# Patient Record
Sex: FEMALE | Employment: OTHER | URBAN - METROPOLITAN AREA
[De-identification: names, ages, dates, MRNs, and addresses within clinical notes are randomized per-mention and may not be internally consistent; named-entity substitution may affect disease eponyms.]

---

## 2024-07-22 ENCOUNTER — HOSPITAL ENCOUNTER (EMERGENCY)
Age: 89
Discharge: HOME | End: 2024-07-22
Payer: MEDICARE

## 2024-07-22 VITALS
DIASTOLIC BLOOD PRESSURE: 61 MMHG | OXYGEN SATURATION: 98 % | WEIGHT: 137 LBS | TEMPERATURE: 98 F | RESPIRATION RATE: 19 BRPM | HEART RATE: 91 BPM | SYSTOLIC BLOOD PRESSURE: 103 MMHG

## 2024-07-22 DIAGNOSIS — Z23 NEED FOR PROPHYLACTIC VACCINATION AND INOCULATION AGAINST RABIES: Primary | ICD-10-CM

## 2024-07-22 PROCEDURE — 96372 THER/PROPH/DIAG INJ SC/IM: CPT

## 2024-07-22 PROCEDURE — 90675 RABIES VACCINE IM: CPT

## 2024-07-22 PROCEDURE — 90375 RABIES IG IM/SC: CPT

## 2024-07-22 PROCEDURE — 99284 EMERGENCY DEPT VISIT MOD MDM: CPT

## 2024-07-22 PROCEDURE — 90471 IMMUNIZATION ADMIN: CPT

## 2024-07-22 PROCEDURE — 6360000002 HC RX W HCPCS

## 2024-07-22 RX ORDER — AMOXICILLIN AND CLAVULANATE POTASSIUM 875; 125 MG/1; MG/1
1 TABLET, FILM COATED ORAL 2 TIMES DAILY
Qty: 14 TABLET | Refills: 0 | Status: SHIPPED | OUTPATIENT
Start: 2024-07-22 | End: 2024-07-29

## 2024-07-22 RX ADMIN — RABIES IMMUNE GLOBULIN (HUMAN) 1200 UNITS: 300 INJECTION, SOLUTION INFILTRATION; INTRAMUSCULAR at 17:39

## 2024-07-22 RX ADMIN — RABIES VACCINE 1 ML: KIT at 17:40

## 2024-07-22 NOTE — ED NOTES
Patient mobility status  with mild difficulty. Provider aware     I have reviewed discharge instructions with the patient.  The patient verbalized understanding.    Patient left ED via Discharge Method: wheelchair to Home with  family/brother .    Opportunity for questions and clarification provided.     Patient given 0 scripts.

## 2024-07-22 NOTE — ED TRIAGE NOTES
Patient arrives to the ER via POV with .  said patient has been feeding outside unknown cats. Patient has small bite to LLE. Patient went to [facility] care for rabies shot was sent here.

## 2024-07-22 NOTE — DISCHARGE INSTRUCTIONS
Take antibiotics as prescribed.       Indication: Rabies postexposure prophylaxis  Prescription date: 7/22/2024   Time: 5:38 PM    Day 0 -  7/22/2024 - Given in ED  Day 3 -  7/25/2024  Day 7-   7/29/2024  Day 14- 8/5/2024          Call and follow up with the urgent care for completion of your vaccinations    [facility] [address]  [address] [place] [id number] [phone]

## 2024-07-26 NOTE — ED PROVIDER NOTES
Emergency Department Provider Note       PCP: No primary care provider on file.   Age: 88 y.o.   Sex: female     DISPOSITION Decision To Discharge 07/22/2024 05:38:35 PM       ICD-10-CM    1. Need for prophylactic vaccination and inoculation against rabies  Z23           Medical Decision Making     Wound recleansed.  Rabies inoculation and vaccination performed.  I will arrange further follow-up with [facility] for completion of series. She has already been prescribed antibiotics. Patient is stable for discharge.     1 acute, uncomplicated illness or injury.      I independently ordered and reviewed each unique test.        History     88-year-old female presents to emergency department stating need for rabies inoculation and vaccination.  She was apparently bitten and scratched by a stray cat near her house.  Originally had presented to urgent care where she was prescribed Augmentin and had her wounds cleansed but was advised to come here for rabies series.  She states that her Tdap is up-to-date and this was verified by urgent care.          Physical Exam     Vitals signs and nursing note reviewed:  Vitals:    07/22/24 1437   BP: 103/61   Pulse: 91   Resp: 19   Temp: 98 °F (36.7 °C)   TempSrc: Oral   SpO2: 98%   Weight: 62.1 kg (137 lb)      Physical Exam  Vitals and nursing note reviewed.   Constitutional:       General: She is not in acute distress.     Appearance: Normal appearance. She is not ill-appearing.   HENT:      Head: Normocephalic and atraumatic.      Right Ear: External ear normal.      Left Ear: External ear normal.      Nose: Nose normal.   Eyes:      General: No scleral icterus.        Right eye: No discharge.         Left eye: No discharge.      Extraocular Movements: Extraocular movements intact.      Conjunctiva/sclera: Conjunctivae normal.      Pupils: Pupils are equal, round, and reactive to light.   Cardiovascular:      Rate and Rhythm: Normal rate and regular rhythm.      Pulses: Normal